# Patient Record
(demographics unavailable — no encounter records)

---

## 2024-10-24 NOTE — CARDIOLOGY SUMMARY
[de-identified] : 9/5/24: YAW [de-identified] : TTE 9/24/24: 1. Left ventricular systolic function is normal with an ejection fraction visually estimated at 55 to 60 %. There are no regional wall motion abnormalities seen. 2. Normal left ventricular diastolic function. 3. Normal right ventricular cavity size and normal right ventricular systolic function. 4. Mild tricuspid regurgitation. [de-identified] : CAC Score: The calculated Agatston score is 0.

## 2024-10-24 NOTE — HISTORY OF PRESENT ILLNESS
[FreeTextEntry1] : 43F with no PMH referred by PMD to establish cardiovascular care.  On her most recent visit to her PCP patient was concerned about her heart her mom  in her 60 from heart disease and her 57-year-old brother has heart disease and recently suffered a myocardial infarction leading to his death. Patient reports distant episode of chest pain with radiation to left arm.   On her last visit patient was ordered a TTE and a CAC score  Patient denies, no palpitations, no JACOBO, no PND, no orthopnea, no leg edema, no claudication, no syncope.

## 2024-10-24 NOTE — ASSESSMENT
[FreeTextEntry1] : 43F with no PMH referred by PMD to establish cardiovascular care.  On her most recent visit to her PCP patient was concerned about her heart her mom  in her 60 from heart disease and her 57-year-old brother has heart disease and recently suffered a myocardial infarction leading to his death. Patient reports distant episode of chest pain with radiation to left arm.   On her last visit patient was ordered a TTE and a CAC score  Patient denies, no palpitations, no JACOBO, no PND, no orthopnea, no leg edema, no claudication, no syncope.   #Chest pain  feeling better TTE unremarkable    #CAD Screening  Ca Score zero  RTC PRN

## 2025-04-09 NOTE — DISCUSSION/SUMMARY
[FreeTextEntry1] : 43-year-old -0-0-2 presents with intermittent left lower quadrant discomfort.  She is sexually active and had bilateral salpingectomies.  Physical examination was essentially normal.  Pap GC chlamydia sent.  Mammogram and pelvic sonogram ordered.  Patient to return in 6 weeks for follow-up.  All questions answered.

## 2025-04-09 NOTE — PHYSICAL EXAM
[Chaperoned Physical Exam] : A chaperone was present in the examining room during all aspects of the physical examination. [MA] : MA [FreeTextEntry2] : Servando [Appropriately responsive] : appropriately responsive [Alert] : alert [No Acute Distress] : no acute distress [No Lymphadenopathy] : no lymphadenopathy [Regular Rate Rhythm] : regular rate rhythm [No Murmurs] : no murmurs [Clear to Auscultation B/L] : clear to auscultation bilaterally [Soft] : soft [Non-tender] : non-tender [Non-distended] : non-distended [No HSM] : No HSM [No Lesions] : no lesions [No Mass] : no mass [Oriented x3] : oriented x3 [Examination Of The Breasts] : a normal appearance [No Masses] : no breast masses were palpable [Labia Majora] : normal [Labia Minora] : normal [Normal] : normal [Adnexa Tenderness On The Left] : tender [Declined] : Patient declined rectal exam

## 2025-04-09 NOTE — HISTORY OF PRESENT ILLNESS
[Y] : Positive pregnancy history [Regular Cycle Intervals] : periods have been regular [Frequency: Q ___ days] : menstrual periods occur approximately every [unfilled] days [Menarche Age: ____] : age at menarche was [unfilled] [FreeTextEntry1] : 43-year-old -0-0-2 last menstrual cycle was 2025 presents for GYN evaluation.  Denies pain bleeding or discharge.  She is sexually active and had bilateral salpingectomies.  She complains of occasional left lower quadrant discomfort. [PGHxTotal] : 2 [[facility] : [facility] [PGHxPremature] : 0 [PGHxAbortions] : 0 [[facility]] : 2 [PGHxABInduced] : 0 [PGHxABSpont] : 0 [PGHxMultBirths] : 0 [PGHxEctopic] : 0

## 2025-05-14 NOTE — PLAN
[FreeTextEntry1] : 44-year-old -0-0-2 presents for follow-up care.  Pelvic pain has subsided and sonogram shows small fibroid uterus.  Pap smear is normal and mammogram is normal according to the patient.  Return in 1 year for follow-up and all questions answered.

## 2025-05-14 NOTE — HISTORY OF PRESENT ILLNESS
[FreeTextEntry1] : 44-year-old -0-0-2 presents for follow-up care.  Pelvic discomfort has subsided.  Sonogram shows small fibroid uterus.  Pap smear is normal and patient had mammogram recently that was normal according to her.

## 2025-06-06 NOTE — ASSESSMENT
[Vaccines Reviewed] : Immunizations reviewed today. Please see immunization details in the vaccine log within the immunization flowsheet.  [FreeTextEntry1] : for cpe has been well sent for colonscopy elavil for tension headache

## 2025-06-06 NOTE — HISTORY OF PRESENT ILLNESS
[FreeTextEntry1] : for cpe [de-identified] : for cpe has blurred vision wants colonoscpy has headache top of head temporal area

## 2025-06-06 NOTE — HEALTH RISK ASSESSMENT
[Excellent] : ~his/her~  mood as  excellent [No falls in past year] : Patient reported no falls in the past year [Little interest or pleasure doing things] : 1) Little interest or pleasure doing things [Feeling down, depressed, or hopeless] : 2) Feeling down, depressed, or hopeless [0] : 2) Feeling down, depressed, or hopeless: Not at all (0) [No] : does not take [Never] : Never [NO] : No [HIV test declined] : HIV test declined [Hepatitis C test declined] : Hepatitis C test declined [MammogramDate] : 2025 [PapSmearDate] : 2025 [ColonoscopyDate] : due